# Patient Record
Sex: FEMALE | Race: BLACK OR AFRICAN AMERICAN | URBAN - METROPOLITAN AREA
[De-identification: names, ages, dates, MRNs, and addresses within clinical notes are randomized per-mention and may not be internally consistent; named-entity substitution may affect disease eponyms.]

---

## 2018-01-02 ENCOUNTER — OFFICE VISIT (OUTPATIENT)
Dept: ORTHOPEDIC SURGERY | Age: 38
End: 2018-01-02

## 2018-01-02 VITALS — HEIGHT: 67 IN | WEIGHT: 130 LBS | BODY MASS INDEX: 20.4 KG/M2

## 2018-01-02 DIAGNOSIS — S62.632A CLOSED DISPLACED FRACTURE OF DISTAL PHALANX OF RIGHT MIDDLE FINGER, INITIAL ENCOUNTER: Primary | ICD-10-CM

## 2018-01-02 PROCEDURE — 99203 OFFICE O/P NEW LOW 30 MIN: CPT | Performed by: ORTHOPAEDIC SURGERY

## 2018-01-02 NOTE — PROGRESS NOTES
Chief Complaint    Pain (right finger)      History of Present Illness:  Martha Hadley is a 30 y.o. female. She is right-hand dominant. She is here for evaluation of her right long finger. She injured her right long finger 2 days ago when she slammed it in a car door. She was seen at the [facility]. and found to have an open distal phalanx fracture of the right long finger. She did have a laceration going through the dorsal nail bed. Her nail was removed in the left sutured laceration and placed her nail back. She's been in a splint since the time of injury. She currently is working in [facility] and is traveling back later this week       Medical History:  Patient's medications, allergies, past medical, surgical, social and family histories were reviewed and updated as appropriate. Review of Systems:  Pertinent items are noted in HPI  Review of systems reviewed from Patient History Form dated on 1/2/18 and available in the patient's chart under the Media tab. Vital Signs:  Ht 5' 7\" (1.702 m)   Wt 130 lb (59 kg)   LMP  (LMP Unknown)   BMI 20.36 kg/m²     General Exam:   Constitutional: Patient is adequately groomed with no evidence of malnutrition  DTRs: Deep tendon reflexes are intact  Mental Status: The patient is oriented to time, place and person. The patient's mood and affect are appropriate. Hand Examination:    Inspection:  Nail and nail bed do appear intact. There is no surrounding erythema or drainage    Palpation:  She does have some tenderness palpation about the distal phalanx    Range of Motion:  Range of motion at the IP joint of the right long finger distal phalanx is limited    Strength:  Sure sensation to the distal phalanx is intact    Special Tests:  She does have brisk cap refill to the distal phalanx    Skin: There are no rashes, ulcerations or lesions.     Gait: Walking with a normal gait      Additional Comments:       Additional Examinations:         Left Upper